# Patient Record
Sex: FEMALE | Race: WHITE | NOT HISPANIC OR LATINO | Employment: UNEMPLOYED | URBAN - METROPOLITAN AREA
[De-identification: names, ages, dates, MRNs, and addresses within clinical notes are randomized per-mention and may not be internally consistent; named-entity substitution may affect disease eponyms.]

---

## 2023-01-01 ENCOUNTER — APPOINTMENT (OUTPATIENT)
Dept: CT IMAGING | Facility: CLINIC | Age: 53
DRG: 871 | End: 2023-01-01
Attending: EMERGENCY MEDICINE

## 2023-01-01 ENCOUNTER — HOSPITAL ENCOUNTER (INPATIENT)
Facility: CLINIC | Age: 53
LOS: 1 days | DRG: 871 | End: 2023-07-15
Attending: EMERGENCY MEDICINE | Admitting: STUDENT IN AN ORGANIZED HEALTH CARE EDUCATION/TRAINING PROGRAM

## 2023-01-01 VITALS
TEMPERATURE: 97.7 F | HEART RATE: 87 BPM | RESPIRATION RATE: 26 BRPM | SYSTOLIC BLOOD PRESSURE: 77 MMHG | DIASTOLIC BLOOD PRESSURE: 45 MMHG | WEIGHT: 72 LBS | OXYGEN SATURATION: 100 %

## 2023-01-01 DIAGNOSIS — K76.7 HEPATORENAL FAILURE (H): ICD-10-CM

## 2023-01-01 LAB
ALBUMIN SERPL BCG-MCNC: 2.5 G/DL (ref 3.5–5.2)
ALP SERPL-CCNC: 191 U/L (ref 35–104)
ALT SERPL W P-5'-P-CCNC: 90 U/L (ref 0–50)
AMMONIA PLAS-SCNC: 233 UMOL/L (ref 11–51)
ANION GAP SERPL CALCULATED.3IONS-SCNC: 24 MMOL/L (ref 7–15)
AST SERPL W P-5'-P-CCNC: 202 U/L (ref 0–45)
ATRIAL RATE - MUSE: 82 BPM
BASOPHILS # BLD MANUAL: 0 10E3/UL (ref 0–0.2)
BASOPHILS NFR BLD MANUAL: 0 %
BILIRUB SERPL-MCNC: 34.2 MG/DL
BUN SERPL-MCNC: 156 MG/DL (ref 6–20)
BURR CELLS BLD QL SMEAR: SLIGHT
CALCIUM SERPL-MCNC: 8.5 MG/DL (ref 8.6–10)
CHLORIDE SERPL-SCNC: 92 MMOL/L (ref 98–107)
CREAT SERPL-MCNC: 7.97 MG/DL (ref 0.51–0.95)
DEPRECATED HCO3 PLAS-SCNC: 20 MMOL/L (ref 22–29)
DIASTOLIC BLOOD PRESSURE - MUSE: NORMAL MMHG
EOSINOPHIL # BLD MANUAL: 0 10E3/UL (ref 0–0.7)
EOSINOPHIL NFR BLD MANUAL: 0 %
ERYTHROCYTE [DISTWIDTH] IN BLOOD BY AUTOMATED COUNT: 15.1 % (ref 10–15)
GFR SERPL CREATININE-BSD FRML MDRD: 6 ML/MIN/1.73M2
GLUCOSE SERPL-MCNC: 119 MG/DL (ref 70–99)
HCT VFR BLD AUTO: 18 % (ref 35–47)
HGB BLD-MCNC: 6.6 G/DL (ref 11.7–15.7)
INR PPP: 2.85 (ref 0.85–1.15)
INTERPRETATION ECG - MUSE: NORMAL
LACTATE SERPL-SCNC: 2.8 MMOL/L (ref 0.7–2)
LYMPHOCYTES # BLD MANUAL: 0 10E3/UL (ref 0.8–5.3)
LYMPHOCYTES NFR BLD MANUAL: 0 %
MAGNESIUM SERPL-MCNC: 2.8 MG/DL (ref 1.7–2.3)
MCH RBC QN AUTO: 40.5 PG (ref 26.5–33)
MCHC RBC AUTO-ENTMCNC: 36.7 G/DL (ref 31.5–36.5)
MCV RBC AUTO: 110 FL (ref 78–100)
METAMYELOCYTES # BLD MANUAL: 0.5 10E3/UL
METAMYELOCYTES NFR BLD MANUAL: 2 %
MONOCYTES # BLD MANUAL: 0 10E3/UL (ref 0–1.3)
MONOCYTES NFR BLD MANUAL: 0 %
NEUTROPHILS # BLD MANUAL: 23 10E3/UL (ref 1.6–8.3)
NEUTROPHILS NFR BLD MANUAL: 98 %
P AXIS - MUSE: 42 DEGREES
PLAT MORPH BLD: ABNORMAL
PLATELET # BLD AUTO: 46 10E3/UL (ref 150–450)
POTASSIUM SERPL-SCNC: 3.3 MMOL/L (ref 3.4–5.3)
PR INTERVAL - MUSE: 172 MS
PROT SERPL-MCNC: ABNORMAL G/DL
QRS DURATION - MUSE: 78 MS
QT - MUSE: 452 MS
QTC - MUSE: 528 MS
R AXIS - MUSE: 3 DEGREES
RBC # BLD AUTO: 1.63 10E6/UL (ref 3.8–5.2)
RBC MORPH BLD: ABNORMAL
SODIUM SERPL-SCNC: 136 MMOL/L (ref 136–145)
SYSTOLIC BLOOD PRESSURE - MUSE: NORMAL MMHG
T AXIS - MUSE: 47 DEGREES
VENTRICULAR RATE- MUSE: 82 BPM
WBC # BLD AUTO: 23.5 10E3/UL (ref 4–11)

## 2023-01-01 PROCEDURE — 83735 ASSAY OF MAGNESIUM: CPT | Performed by: EMERGENCY MEDICINE

## 2023-01-01 PROCEDURE — 93005 ELECTROCARDIOGRAM TRACING: CPT

## 2023-01-01 PROCEDURE — 96376 TX/PRO/DX INJ SAME DRUG ADON: CPT

## 2023-01-01 PROCEDURE — 84075 ASSAY ALKALINE PHOSPHATASE: CPT | Performed by: EMERGENCY MEDICINE

## 2023-01-01 PROCEDURE — 82140 ASSAY OF AMMONIA: CPT | Performed by: EMERGENCY MEDICINE

## 2023-01-01 PROCEDURE — 70450 CT HEAD/BRAIN W/O DYE: CPT

## 2023-01-01 PROCEDURE — 85027 COMPLETE CBC AUTOMATED: CPT | Performed by: EMERGENCY MEDICINE

## 2023-01-01 PROCEDURE — 250N000009 HC RX 250: Performed by: STUDENT IN AN ORGANIZED HEALTH CARE EDUCATION/TRAINING PROGRAM

## 2023-01-01 PROCEDURE — 99238 HOSP IP/OBS DSCHRG MGMT 30/<: CPT | Performed by: NURSE PRACTITIONER

## 2023-01-01 PROCEDURE — 120N000001 HC R&B MED SURG/OB

## 2023-01-01 PROCEDURE — 36415 COLL VENOUS BLD VENIPUNCTURE: CPT | Performed by: EMERGENCY MEDICINE

## 2023-01-01 PROCEDURE — 85610 PROTHROMBIN TIME: CPT | Performed by: EMERGENCY MEDICINE

## 2023-01-01 PROCEDURE — 71250 CT THORAX DX C-: CPT

## 2023-01-01 PROCEDURE — 99207 PR NO BILLABLE SERVICE THIS VISIT: CPT | Performed by: STUDENT IN AN ORGANIZED HEALTH CARE EDUCATION/TRAINING PROGRAM

## 2023-01-01 PROCEDURE — 96374 THER/PROPH/DIAG INJ IV PUSH: CPT

## 2023-01-01 PROCEDURE — 96361 HYDRATE IV INFUSION ADD-ON: CPT

## 2023-01-01 PROCEDURE — 85007 BL SMEAR W/DIFF WBC COUNT: CPT | Performed by: EMERGENCY MEDICINE

## 2023-01-01 PROCEDURE — 99285 EMERGENCY DEPT VISIT HI MDM: CPT | Mod: 25

## 2023-01-01 PROCEDURE — 83605 ASSAY OF LACTIC ACID: CPT | Performed by: EMERGENCY MEDICINE

## 2023-01-01 PROCEDURE — 80048 BASIC METABOLIC PNL TOTAL CA: CPT | Performed by: EMERGENCY MEDICINE

## 2023-01-01 PROCEDURE — 250N000011 HC RX IP 250 OP 636: Performed by: STUDENT IN AN ORGANIZED HEALTH CARE EDUCATION/TRAINING PROGRAM

## 2023-01-01 PROCEDURE — 258N000003 HC RX IP 258 OP 636: Performed by: EMERGENCY MEDICINE

## 2023-01-01 RX ORDER — PROCHLORPERAZINE MALEATE 10 MG
10 TABLET ORAL EVERY 6 HOURS PRN
Status: DISCONTINUED | OUTPATIENT
Start: 2023-01-01 | End: 2023-01-01 | Stop reason: HOSPADM

## 2023-01-01 RX ORDER — OLANZAPINE 5 MG/1
5 TABLET, ORALLY DISINTEGRATING ORAL EVERY 6 HOURS PRN
Status: DISCONTINUED | OUTPATIENT
Start: 2023-01-01 | End: 2023-01-01 | Stop reason: HOSPADM

## 2023-01-01 RX ORDER — LORAZEPAM 1 MG/1
1 TABLET ORAL
Status: DISCONTINUED | OUTPATIENT
Start: 2023-01-01 | End: 2023-01-01 | Stop reason: HOSPADM

## 2023-01-01 RX ORDER — MORPHINE SULFATE 2 MG/ML
2 INJECTION, SOLUTION INTRAMUSCULAR; INTRAVENOUS
Status: DISCONTINUED | OUTPATIENT
Start: 2023-01-01 | End: 2023-01-01 | Stop reason: HOSPADM

## 2023-01-01 RX ORDER — ATROPINE SULFATE 10 MG/ML
2 SOLUTION/ DROPS OPHTHALMIC EVERY 4 HOURS PRN
Status: DISCONTINUED | OUTPATIENT
Start: 2023-01-01 | End: 2023-01-01 | Stop reason: HOSPADM

## 2023-01-01 RX ORDER — MORPHINE SULFATE 4 MG/ML
4 INJECTION, SOLUTION INTRAMUSCULAR; INTRAVENOUS ONCE
Status: COMPLETED | OUTPATIENT
Start: 2023-01-01 | End: 2023-01-01

## 2023-01-01 RX ORDER — MORPHINE SULFATE 20 MG/ML
5 SOLUTION ORAL
Status: DISCONTINUED | OUTPATIENT
Start: 2023-01-01 | End: 2023-01-01 | Stop reason: HOSPADM

## 2023-01-01 RX ORDER — MORPHINE SULFATE 2 MG/ML
1 INJECTION, SOLUTION INTRAMUSCULAR; INTRAVENOUS
Status: DISCONTINUED | OUTPATIENT
Start: 2023-01-01 | End: 2023-01-01 | Stop reason: HOSPADM

## 2023-01-01 RX ORDER — MORPHINE SULFATE 10 MG/5ML
5 SOLUTION ORAL
Status: DISCONTINUED | OUTPATIENT
Start: 2023-01-01 | End: 2023-01-01 | Stop reason: HOSPADM

## 2023-01-01 RX ORDER — BISACODYL 10 MG
10 SUPPOSITORY, RECTAL RECTAL
Status: DISCONTINUED | OUTPATIENT
Start: 2023-07-18 | End: 2023-01-01 | Stop reason: HOSPADM

## 2023-01-01 RX ORDER — ONDANSETRON 4 MG/1
4 TABLET, ORALLY DISINTEGRATING ORAL EVERY 6 HOURS PRN
Status: DISCONTINUED | OUTPATIENT
Start: 2023-01-01 | End: 2023-01-01 | Stop reason: HOSPADM

## 2023-01-01 RX ORDER — CARBOXYMETHYLCELLULOSE SODIUM 5 MG/ML
1-2 SOLUTION/ DROPS OPHTHALMIC
Status: DISCONTINUED | OUTPATIENT
Start: 2023-01-01 | End: 2023-01-01 | Stop reason: HOSPADM

## 2023-01-01 RX ORDER — LORAZEPAM 2 MG/ML
1 INJECTION INTRAMUSCULAR
Status: DISCONTINUED | OUTPATIENT
Start: 2023-01-01 | End: 2023-01-01 | Stop reason: HOSPADM

## 2023-01-01 RX ORDER — NALOXONE HYDROCHLORIDE 0.4 MG/ML
0.2 INJECTION, SOLUTION INTRAMUSCULAR; INTRAVENOUS; SUBCUTANEOUS
Status: DISCONTINUED | OUTPATIENT
Start: 2023-01-01 | End: 2023-01-01 | Stop reason: HOSPADM

## 2023-01-01 RX ORDER — GLYCOPYRROLATE 0.2 MG/ML
0.2 INJECTION, SOLUTION INTRAMUSCULAR; INTRAVENOUS EVERY 4 HOURS PRN
Status: DISCONTINUED | OUTPATIENT
Start: 2023-01-01 | End: 2023-01-01 | Stop reason: HOSPADM

## 2023-01-01 RX ORDER — MINERAL OIL/HYDROPHIL PETROLAT
OINTMENT (GRAM) TOPICAL
Status: DISCONTINUED | OUTPATIENT
Start: 2023-01-01 | End: 2023-01-01 | Stop reason: HOSPADM

## 2023-01-01 RX ORDER — PROCHLORPERAZINE 25 MG
25 SUPPOSITORY, RECTAL RECTAL EVERY 12 HOURS PRN
Status: DISCONTINUED | OUTPATIENT
Start: 2023-01-01 | End: 2023-01-01 | Stop reason: HOSPADM

## 2023-01-01 RX ORDER — ECHINACEA PURPUREA EXTRACT 125 MG
1 TABLET ORAL
Status: DISCONTINUED | OUTPATIENT
Start: 2023-01-01 | End: 2023-01-01 | Stop reason: HOSPADM

## 2023-01-01 RX ORDER — HALOPERIDOL 5 MG/ML
1 INJECTION INTRAMUSCULAR
Status: DISCONTINUED | OUTPATIENT
Start: 2023-01-01 | End: 2023-01-01 | Stop reason: HOSPADM

## 2023-01-01 RX ORDER — NALOXONE HYDROCHLORIDE 0.4 MG/ML
0.1 INJECTION, SOLUTION INTRAMUSCULAR; INTRAVENOUS; SUBCUTANEOUS
Status: DISCONTINUED | OUTPATIENT
Start: 2023-01-01 | End: 2023-01-01 | Stop reason: HOSPADM

## 2023-01-01 RX ORDER — SALIVA STIMULANT COMB. NO.3
2 SPRAY, NON-AEROSOL (ML) MUCOUS MEMBRANE
Status: DISCONTINUED | OUTPATIENT
Start: 2023-01-01 | End: 2023-01-01 | Stop reason: HOSPADM

## 2023-01-01 RX ORDER — MORPHINE SULFATE 10 MG/5ML
10 SOLUTION ORAL
Status: DISCONTINUED | OUTPATIENT
Start: 2023-01-01 | End: 2023-01-01 | Stop reason: HOSPADM

## 2023-01-01 RX ORDER — MORPHINE SULFATE 20 MG/ML
10 SOLUTION ORAL
Status: DISCONTINUED | OUTPATIENT
Start: 2023-01-01 | End: 2023-01-01 | Stop reason: HOSPADM

## 2023-01-01 RX ORDER — ONDANSETRON 2 MG/ML
4 INJECTION INTRAMUSCULAR; INTRAVENOUS EVERY 6 HOURS PRN
Status: DISCONTINUED | OUTPATIENT
Start: 2023-01-01 | End: 2023-01-01 | Stop reason: HOSPADM

## 2023-01-01 RX ADMIN — HALOPERIDOL LACTATE 1 MG: 5 INJECTION, SOLUTION INTRAMUSCULAR at 17:31

## 2023-01-01 RX ADMIN — LORAZEPAM 1 MG: 2 INJECTION INTRAMUSCULAR; INTRAVENOUS at 17:16

## 2023-01-01 RX ADMIN — GLYCOPYRROLATE 0.2 MG: 0.2 INJECTION, SOLUTION INTRAMUSCULAR; INTRAVENOUS at 17:30

## 2023-01-01 RX ADMIN — MORPHINE SULFATE 4 MG: 4 INJECTION, SOLUTION INTRAMUSCULAR; INTRAVENOUS at 14:31

## 2023-01-01 RX ADMIN — MORPHINE SULFATE 2 MG: 2 INJECTION, SOLUTION INTRAMUSCULAR; INTRAVENOUS at 16:15

## 2023-01-01 RX ADMIN — SODIUM CHLORIDE 1000 ML: 9 INJECTION, SOLUTION INTRAVENOUS at 11:25

## 2023-01-01 RX ADMIN — MORPHINE SULFATE 2 MG: 2 INJECTION, SOLUTION INTRAMUSCULAR; INTRAVENOUS at 17:16

## 2023-01-01 ASSESSMENT — ACTIVITIES OF DAILY LIVING (ADL)
ADLS_ACUITY_SCORE: 35

## 2023-07-15 PROBLEM — K76.7 HEPATORENAL FAILURE (H): Status: ACTIVE | Noted: 2023-01-01

## 2023-07-15 NOTE — PROGRESS NOTES
RECEIVING UNIT ED HANDOFF REVIEW    ED Nurse Handoff Report was reviewed by: Fred Santizo RN on July 15, 2023 at 4:08 PM       Addendum:  Patient  at 17:45.      -- Fred Santizo RN

## 2023-07-15 NOTE — DISCHARGE SUMMARY
[facility]    Death Summary - Hospitalist Service     Date of Admission:  7/15/2023  Date of Death: 7/15/2023  Discharging Provider: Dimitrios Johnson MD    Discharge Diagnoses   Presumed hepatorenal syndrome  Hypochloremia  Acute severe kidney injury  Hepatic encephalopathy, severe  Cirrhosis, due to EtOH use  Acute liver failure  Severe EtOH use disorder  Lactic acidosis, suspect due to liver failure  Leukocytosis  Sepsis, severe, considered (leukocytosis, lactic acidosis, hypotension, tachypnea)  Macrocytic, severe anemia, likely related to liver failure and EtOH use  Elevated INR due to liver failure  Ascites, due to liver failure  Hypokalemia  AGMA  Comfort Cares    Cause of death: Cirrhosis likely due to EtOH use disorder resulting in hepatorenal syndrome.    Hospital Course   Yesenia Vail is a 53 year old female admitted on 7/15/2023. She presented with likely hepatorenal syndrome. After discussion of patient's dire prognosis based on her presenting vitals, exam, labs, and prior history, family opted for comfort cares. Patient  several hours later on comfort cares.    May she rest in peace.      Dimitrios Johnson MD  [facility]  ______________________________________________________________________      Significant Results and Procedures   Most Recent 3 CBC's:Recent Labs   Lab Test 07/15/23  1122   WBC 23.5*   HGB 6.6*   *   PLT 46*     Most Recent 3 BMP's:Recent Labs   Lab Test 07/15/23  1122      POTASSIUM 3.3*   CHLORIDE 92*   CO2 20*   .0*   CR 7.97*   ANIONGAP 24*   KADE 8.5*   *     Most Recent 2 LFT's:Recent Labs   Lab Test 07/15/23  1122   *   ALT 90*   ALKPHOS 191*   BILITOTAL 34.2*     Most Recent 3 INR's:Recent Labs   Lab Test 07/15/23  1122   INR 2.85*   ,   Results for orders placed or performed during the hospital encounter of 07/15/23   CT Head w/o Contrast    Narrative    EXAM: CT HEAD WITHOUT  CONTRAST  LOCATION: [facility]  DATE: 07/15/2023    INDICATION: Altered mental status.  COMPARISON: None.  TECHNIQUE: Routine CT Head without IV contrast. Multiplanar reformats. Dose reduction techniques were used.    FINDINGS:  INTRACRANIAL CONTENTS: No intracranial hemorrhage, extra-axial collection, or mass effect.  No CT evidence of acute infarct. Mild presumed chronic small vessel ischemic changes. Mild generalized volume loss. No hydrocephalus.     VISUALIZED ORBITS/SINUSES/MASTOIDS: No intraorbital abnormality. No paranasal sinus mucosal disease. No middle ear or mastoid effusion.    BONES/SOFT TISSUES: No acute abnormality.      Impression    IMPRESSION:  1.  No CT evidence for acute intracranial process.  2.  Brain atrophy and presumed chronic microvascular ischemic changes as above.       CT Chest Abdomen Pelvis w/o Contrast    Narrative    EXAM: CT CHEST, ABDOMEN, PELVIS WITHOUT CONTRAST  LOCATION: [facility]  DATE: 7/15/2023    INDICATION: Altered mental status. Severe jaundice.  COMPARISON: None.  TECHNIQUE: CT scan of the chest, abdomen, and pelvis was performed without IV contrast. Multiplanar reformats were obtained. Dose reduction techniques were used.   CONTRAST: None.    FINDINGS:   LUNGS AND PLEURA: Trace bibasilar pleural fluid. Motion artifact limits assessment. Bibasilar atelectasis.    MEDIASTINUM/AXILLAE: Small hiatal hernia. No suspicious enlarged lymph node. No acute mediastinal abnormality.    CORONARY ARTERY CALCIFICATION: None.    HEPATOBILIARY: Lobulated liver compatible with hepatic cirrhosis. Unenhanced assessment of the liver is limited to visualize any potential masses. Increased density within the gallbladder that could be ill-defined stones versus other material.    PANCREAS: Some edema about the pancreas noted. Within limits of unenhanced scanning, no definable lesion can be seen but this is a limited assessment for pancreas  mass.    SPLEEN: Normal.    ADRENAL GLANDS: Normal.    KIDNEYS/BLADDER: No hydronephrosis or stone. Bladder unremarkable.    BOWEL: No obstruction. No convincing acute inflammation.    LYMPH NODES: Normal.    VASCULATURE: Unremarkable.    PELVIC ORGANS: No acute abnormality identified. The uterus is present. No adnexal visible lesion.    MUSCULOSKELETAL: Moderate ascites. There is a degree of anasarca. No aggressive bone lesion identified.      Impression    IMPRESSION:  1.  Cirrhotic liver. Moderate ascites. Anasarca noted.  2.  Trace bibasilar pleural fluid.  3.  Increased density material within the gallbladder could be gallstones versus other material.  4.  Unenhanced scanning is a limited assessment for underlying hepatic or pancreas masses.             Consultations This Hospital Stay   None    Primary Care Physician   Physician No Ref-Primary    Time Spent on this Encounter   IDimitrios MD, personally saw the patient today and spent less than or equal to 30 minutes discharging this patient.

## 2023-07-15 NOTE — DEATH PRONOUNCEMENT
[facility] DEATH PRONOUNCEMENT    Called to pronounce Yesenia Vail dead.    Physical Exam: Spontaneous respirations absent, Carotid pulse absent and Heart sounds absent    Patient was pronounced dead at 17:45 PM, July 15, 2023.    Preliminary Cause of Death: severe anemia, presumed hepatorenal syndrome in light of the liver failure and severe alcohol use, in the setting of comfort cares    Principal Problem:    Hepatorenal failure (H)     Infectious disease present?: NO    Communicable disease present? (examples: HIV, chicken pox, TB, Ebola, CJD) :  NO    Multi-drug resistant organism present? (example: MRSA): NO    Please consider an autopsy if any of the following exist:  NO Unexpected or unexplained death during or following any dental, medical, or surgical diagnostic treatment procedures.   NO Death of mother at or up to seven days after delivery.     NO All  and pediatric deaths.     NO Death where the cause is sufficiently obscure to delay completion of the death certificate.   NO Deaths in which autopsy would confirm a suspected illness/condition that would affect surviving family members or recipients of transplanted organs.     The following deaths must be reported to the 's Office:  NO A death that may be due entirely or in part to any factors other than natural disease (recent surgery, recent trauma, suspected abuse/neglect).   NO A death that may be an accident, suicide, or homicide.     NO Any sudden, unexpected death in which there is no prior history of significant heart disease or any other condition associated with sudden death.   NO A death under suspicious, unusual, or unexpected circumstances.    NO Any death which is apparently due to natural causes but in which the  does not have a personal physician familiar with the patient s medical history, social, or environmental situation or the circumstances of the terminal event.   NO Any death apparently due to Sudden Infant  Death Syndrome.     NO Deaths that occur during, in association with, or as consequences of a diagnostic, therapeutic, or anesthetic procedure.   NO Any death in which a fracture of a major bone has occurred within the past (6) six months.   NO A death of persons note seen by their physician within 120 days of demise.     NO Any death in which the  was an inmate of a public institution or was in the custody of Law Enforcement personnel.   NO  All unexpected deaths of children   NO Solid organ donors   NO Unidentified bodies   UNKNOWN Deaths of persons whose bodies are to be cremated or otherwise disposed of so that the bodies will later be unavailable for examination;   NO Deaths unattended by a physician outside of a licensed healthcare facility or licensed residential hospice program   NO Deaths occurring within 24 hours of arrival to a health care facility if death is unexpected.    NO Deaths associated with the decedent s employment.   NO Deaths attributed to acts of terrorism.   NO Any death in which there is uncertainty as to whether it is a medical examiner s care should be discussed with the medical investigator.        Body disposition: Autopsy was discussed with family member:  Significant other in person.  Permission for autopsy was declined.      ELIZABETH Bloom [facility]  Securely message with the [employer]oc[employer] Web Console (learn more here)  Text page via [employer] Paging/Directory

## 2023-07-15 NOTE — H&P
[facility]    History and Physical - Hospitalist Service       Date of Admission:  7/15/2023    Assessment & Plan      Yesenia Vail is a 53 year old female admitted on 7/15/2023. She presents with likely hepatorenal syndrome. Family agreeable to comfort cares.     Comfort cares  * patient is very near death and critically ill at time of admission. Family consents to comfort cares in light of very poor prognosis based on admission labs and patient's historic avoidance of healthcare.   - inpatient  - Family declines hospice consult  - comfort cares orderset with morphine and ativan  - life expectancy is hours to a few short days  - Please place fan at bedside for dyspnea    Issues not addressed due to Comfort Care status  Presumed hepatorenal syndrome  Hypochloremia  Acute severe kidney injury  Hepatic encephalopathy, severe  Cirrhosis, due to EtOH use  Acute liver failure  Severe EtOH use disorder  Lactic acidosis, suspect due to liver failure  Leukocytosis  Sepsis, severe, considered (leukocytosis, lactic acidosis, hypotension, tachypnea)  Macrocytic, severe anemia, likely related to liver failure and EtOH use  Elevated INR due to liver failure  Ascites, due to liver failure  Hypokalemia  AGMA    Computed MELD 3.0 unavailable. Necessary lab results were not found in the last year.  MELD-Na: 45 at 7/15/2023 11:22 AM  Calculated from:  Serum Creatinine: 7.97 mg/dL (Using max of 4 mg/dL) at 7/15/2023 11:22 AM  Serum Sodium: 136 mmol/L at 7/15/2023 11:22 AM  Total Bilirubin: 34.2 mg/dL at 7/15/2023 11:22 AM  INR(ratio): 2.85 at 7/15/2023 11:22 AM       Diet:   eat for comfort with regular diet, but expect she will take nothing by mouth  DVT Prophylaxis: comfort care  Cao Catheter: Not present  Lines: None     Cardiac Monitoring: None  Code Status:   DNR/DNI    Clinically Significant Risk Factors Present on Admission        # Hypokalemia: Lowest K = 3.3 mmol/L in last 2 days, will replace  as needed      # Anion Gap Metabolic Acidosis: Highest Anion Gap = 24 mmol/L in last 2 days, will monitor and treat as appropriate  # Hypoalbuminemia: Lowest albumin = 2.5 g/dL at 7/15/2023 11:22 AM, will monitor as appropriate  # Coagulation Defect: INR = 2.85 (Ref range: 0.85 - 1.15) and/or PTT = N/A, will monitor for bleeding  # Thrombocytopenia: Lowest platelets = 46 in last 2 days, will monitor for bleeding                 Disposition Plan      Expected Discharge Date: 07/17/2023                  Dimitrios Johnson MD  Hospitalist Service  [facility]  Securely message with [employer] (more info)  Text page via [facility] Paging/Directory     ______________________________________________________________________    Chief Complaint   AMS    History is obtained from the patient, electronic health record, emergency department physician, patient's daughter, patient's mother and patient's spouse    History of Present Illness   Yesenia Vail is a 53 year old female who has history of chronic alcohol use.  She has historically eschewed healthcare and has not been seen in the last 5 years.  Patient has been drinking alcohol heavily for the last many years.  Over the last week or more family has noticed a progressive change in her level of alertness, appetite, and activity.  On Monday she was able to eat half of a sandwich, but since that time has progressively eaten and drank less fluids.  On 7/14 she was able to eat some watermelon.  Overnight family noticed that there was an acute change in her work of breathing as well as her level of mentation.  Yesterday she was able to verbalize some although family thought that it was somewhat nonsensical and not goal-directed at times.  On 7/15, the patient is completely nonverbal and unresponsive.    In the emergency department she is under the care of Dr. Orlin Gonzales.  Case was discussed with him.  I reviewed the labs which are dire as outlined below  and in the medical record.  I reviewed the CT chest abdomen pelvis and the CT head.  We discussed that her labs are notable for severe anemia, presumed hepatorenal syndrome in light of the liver failure and severe alcohol use.  Dr. Gonzales and I discussed that treatment options are extremely limited in light of her vitals, her clinical exam, her clinical history, and her labs.  Dr. Gonzales reportedly discussed with the family about the extremely dire and life-threatening situation.    I discussed with family about the life-threatening situation.  I discussed that with her current laboratory work and her clinical exam and her history that her chance of survival is extremely low and any intervention that we did would likely just prolong the dying process and inflict discomfort on the patient.  The patient's mother is present and she notes that she has had previous experience with hospice and she thinks that that is appropriate.  The patient's  and daughter are also present and they agree that as there is nothing that we can be certain will save the patient's life they think that hospice is also appropriate.  We discussed admission under the comfort care guidelines and whether they would like hospice consulted while she is present.  They declined involvement of hospice while she is admitted.  We will admit the patient under inpatient status.  Expect that she will have hours to days left.      Past Medical History    No past medical history on file.    Past Surgical History   No past surgical history on file.    Prior to Admission Medications   None           Physical Exam   Vital Signs: Temp: 97.7  F (36.5  C) Temp src: Temporal BP: (!) 86/61 Pulse: 85   Resp: 23 SpO2: 99 % O2 Device: None (Room air)    Weight: 72 lbs 0 oz    Constitutional: The patient appears dyspneic and in moderate respiratory distress.  She appears acutely ill and appears to be in the active dying process  Respiratory: She is dyspneic  with increased work of breathing.  She is tachypneic  Cardiovascular: Regular rate on telemetry.  GI: Mildly distended but not obviously tender  Skin/Integumen: The patient is extremely jaundiced.  Other:       Medical Decision Making       ------------------ MEDICAL DECISION MAKING ------------------------------------------------------------------------------------------------------  MANAGEMENT DISCUSSED with the following over the past 24 hours: ED MD, family, RN, pharmD   NOTE(S)/MEDICAL RECORDS REVIEWED over the past 24 hours: ED note  Tests ORDERED & REVIEWED in the past 24 hours:  - BMP  - CBC  - Coags/INR  - Lactic Acid  - LFTs  - Troponin  Tests personally interpreted in the past 24 hours:  - CHEST CT showing effusions  - ABDOMINAL CT showing ascites, cirrhosis  - HEAD CT showing no acute findings  SUPPLEMENTAL HISTORY, in addition to the patient's history, over the past 24 hours obtained from:   - Spouse or significant other  - mother and daughter  Medical complexity over the past 24 hours:  - Decision to DE-ESCALATE CARE based on prognosis      Data   ------------------------- PAST 24 HR DATA REVIEWED -----------------------------------------------    I have personally reviewed the following data over the past 24 hrs:    23.5 (H)  \   6.6 (LL)   / 46 (LL)     136 92 (L) 156.0 (H) /  119 (H)   3.3 (L) 20 (L) 7.97 (H) \       ALT: 90 (H) AST: 202 (H) AP: 191 (H) TBILI: 34.2 (HH)   ALB: 2.5 (L) TOT PROTEIN: N/A LIPASE: N/A       Procal: N/A CRP: N/A Lactic Acid: 2.8 (H)       INR:  2.85 (H) PTT:  N/A   D-dimer:  N/A Fibrinogen:  N/A       Imaging results reviewed over the past 24 hrs:   Recent Results (from the past 24 hour(s))   CT Head w/o Contrast    Narrative    EXAM: CT HEAD WITHOUT CONTRAST  LOCATION: [facility] [place]  DATE: 07/15/2023    INDICATION: Altered mental status.  COMPARISON: None.  TECHNIQUE: Routine CT Head without IV contrast. Multiplanar reformats. Dose reduction  techniques were used.    FINDINGS:  INTRACRANIAL CONTENTS: No intracranial hemorrhage, extra-axial collection, or mass effect.  No CT evidence of acute infarct. Mild presumed chronic small vessel ischemic changes. Mild generalized volume loss. No hydrocephalus.     VISUALIZED ORBITS/SINUSES/MASTOIDS: No intraorbital abnormality. No paranasal sinus mucosal disease. No middle ear or mastoid effusion.    BONES/SOFT TISSUES: No acute abnormality.      Impression    IMPRESSION:  1.  No CT evidence for acute intracranial process.  2.  Brain atrophy and presumed chronic microvascular ischemic changes as above.       CT Chest Abdomen Pelvis w/o Contrast    Narrative    EXAM: CT CHEST, ABDOMEN, PELVIS WITHOUT CONTRAST  LOCATION: [facility]  DATE: 7/15/2023    INDICATION: Altered mental status. Severe jaundice.  COMPARISON: None.  TECHNIQUE: CT scan of the chest, abdomen, and pelvis was performed without IV contrast. Multiplanar reformats were obtained. Dose reduction techniques were used.   CONTRAST: None.    FINDINGS:   LUNGS AND PLEURA: Trace bibasilar pleural fluid. Motion artifact limits assessment. Bibasilar atelectasis.    MEDIASTINUM/AXILLAE: Small hiatal hernia. No suspicious enlarged lymph node. No acute mediastinal abnormality.    CORONARY ARTERY CALCIFICATION: None.    HEPATOBILIARY: Lobulated liver compatible with hepatic cirrhosis. Unenhanced assessment of the liver is limited to visualize any potential masses. Increased density within the gallbladder that could be ill-defined stones versus other material.    PANCREAS: Some edema about the pancreas noted. Within limits of unenhanced scanning, no definable lesion can be seen but this is a limited assessment for pancreas mass.    SPLEEN: Normal.    ADRENAL GLANDS: Normal.    KIDNEYS/BLADDER: No hydronephrosis or stone. Bladder unremarkable.    BOWEL: No obstruction. No convincing acute inflammation.    LYMPH NODES: Normal.    VASCULATURE:  Unremarkable.    PELVIC ORGANS: No acute abnormality identified. The uterus is present. No adnexal visible lesion.    MUSCULOSKELETAL: Moderate ascites. There is a degree of anasarca. No aggressive bone lesion identified.      Impression    IMPRESSION:  1.  Cirrhotic liver. Moderate ascites. Anasarca noted.  2.  Trace bibasilar pleural fluid.  3.  Increased density material within the gallbladder could be gallstones versus other material.  4.  Unenhanced scanning is a limited assessment for underlying hepatic or pancreas masses.

## 2023-07-15 NOTE — ED PROVIDER NOTES
History   Chief Complaint:  Altered Mental Status     The history is provided by the EMS personnel and the spouse.      Yesenia Vail is a 53 year old female with history of jaundice and cirrhosis who presents with altered mental state.  states she has been dealing with jaundice for a long time but it is getting worse. She has not seen a doctor in 5 years. She was diagnosed with cirrhosis of the liver. Patient continued drinking alcohol 3 years ago.  reports she was sitting and talking normally yesterday. Yesenia has been having a decreased appetite.     Independent Historian:   Spouse/Partner - They report the history.    Review of External Notes:   none    Medications:    The patient is currently on no regular medications.    Past Medical History:    Jaundice   Cirrhosis  Hepatorenal failure     Physical Exam     Patient Vitals for the past 24 hrs:   BP Temp Temp src Pulse Resp SpO2 Weight   07/15/23 1426 (!) 77/45 -- -- 87 26 100 % --   07/15/23 1307 -- -- -- 85 23 99 % --   07/15/23 1252 -- -- -- 86 21 100 % --   07/15/23 1237 -- -- -- 82 29 100 % --   07/15/23 1222 -- -- -- 83 16 100 % --   07/15/23 1207 (!) 86/61 -- -- 85 18 (!) 73 % --   07/15/23 1143 97/58 -- -- 86 17 97 % --   07/15/23 1115 90/50 -- -- -- -- 96 % --   07/15/23 1110 (!) 89/60 97.7  F (36.5  C) Temporal 89 14 98 % 32.7 kg (72 lb)      Physical Exam  Constitutional: Middle aged white women supine  HENT: No signs of trauma. Extreme jaundiced. Bloody drainage from lips.   Eyes: EOM are normal. Pupils 4mm, round, deviated to the right unable to cooperate with EOM.  Neck: Normal range of motion. No JVD present. No cervical adenopathy.  Cardiovascular: Regular rhythm.  Exam reveals no gallop and no friction rub.    No murmur heard.  Pulmonary/Chest: Bilateral breath sounds normal. No wheezes, rhonchi or rales. No respiratory distress.  Abdominal: Soft. No tenderness. No rebound or guarding. Distended with ascites.    Musculoskeletal: 1+  edema. No tenderness.   Lymphadenopathy: No lymphadenopathy.   Neurological: Unresponsive to voice. No spontaneous movement or extremities. No facial asymmetry;  Moans, Toes are up bilaterally.  Skin: jaundice    Emergency Department Course   ECG  ECG taken at 1116, ECG read at 1116  Normal sinus rhythm  Possible left atrial enlargment   Nonspecific ST and T wave abnormality  Rate 82 bpm. WI interval 172 ms. QRS duration 78 ms. QT/QTc 452/538 ms. P-R-T axes 42 3 47.     Imaging:  CT Chest Abdomen Pelvis w/o Contrast   Final Result   IMPRESSION:   1.  Cirrhotic liver. Moderate ascites. Anasarca noted.   2.  Trace bibasilar pleural fluid.   3.  Increased density material within the gallbladder could be gallstones versus other material.   4.  Unenhanced scanning is a limited assessment for underlying hepatic or pancreas masses.            CT Head w/o Contrast   Final Result   IMPRESSION:   1.  No CT evidence for acute intracranial process.   2.  Brain atrophy and presumed chronic microvascular ischemic changes as above.               Report per radiology    Laboratory:  Labs Ordered and Resulted from Time of ED Arrival to Time of ED Departure   INR - Abnormal       Result Value    INR 2.85 (*)    COMPREHENSIVE METABOLIC PANEL - Abnormal    Sodium 136      Potassium 3.3 (*)     Chloride 92 (*)     Carbon Dioxide (CO2) 20 (*)     Anion Gap 24 (*)     Urea Nitrogen 156.0 (*)     Creatinine 7.97 (*)     Calcium 8.5 (*)     Glucose 119 (*)     Alkaline Phosphatase 191 (*)      (*)     ALT 90 (*)     Protein Total        Albumin 2.5 (*)     Bilirubin Total 34.2 (*)     GFR Estimate 6 (*)    LACTIC ACID WHOLE BLOOD - Abnormal    Lactic Acid 2.8 (*)    AMMONIA - Abnormal    Ammonia 233 (*)    MAGNESIUM - Abnormal    Magnesium 2.8 (*)    CBC WITH PLATELETS AND DIFFERENTIAL - Abnormal    WBC Count 23.5 (*)     RBC Count 1.63 (*)     Hemoglobin 6.6 (*)     Hematocrit 18.0 (*)      (*)     MCH  40.5 (*)     MCHC 36.7 (*)     RDW 15.1 (*)     Platelet Count 46 (*)    DIFFERENTIAL - Abnormal    % Neutrophils 98      % Lymphocytes 0      % Monocytes 0      % Eosinophils 0      % Basophils 0      % Metamyelocytes 2      Absolute Neutrophils 23.0 (*)     Absolute Lymphocytes 0.0 (*)     Absolute Monocytes 0.0      Absolute Eosinophils 0.0      Absolute Basophils 0.0      Absolute Metamyelocytes 0.5 (*)     RBC Morphology Confirmed RBC Indices      Platelet Assessment        Value: Automated Count Confirmed. Platelet morphology is normal.    Lea Cells Slight (*)       Emergency Department Course & Assessments:    Interventions:  Medications   morphine solution 5 mg (has no administration in time range)     Or   morphine sulfate (ROXANOL) 20 mg/mL (HIGH CONC) soln 5 mg (has no administration in time range)   morphine solution 10 mg (has no administration in time range)     Or   morphine sulfate (ROXANOL) 20 mg/mL (HIGH CONC) soln 10 mg (has no administration in time range)   morphine (PF) injection 1 mg (has no administration in time range)   morphine (PF) injection 2 mg (2 mg Intravenous $Given 7/15/23 1615)   LORazepam (ATIVAN) injection 1 mg (has no administration in time range)     Or   LORazepam (ATIVAN) tablet 1 mg (has no administration in time range)   OLANZapine zydis (zyPREXA) ODT tab 5 mg (has no administration in time range)   prochlorperazine (COMPAZINE) injection 10 mg (has no administration in time range)     Or   prochlorperazine (COMPAZINE) tablet 10 mg (has no administration in time range)     Or   prochlorperazine (COMPAZINE) suppository 25 mg (has no administration in time range)   ondansetron (ZOFRAN ODT) ODT tab 4 mg (has no administration in time range)     Or   ondansetron (ZOFRAN) injection 4 mg (has no administration in time range)   atropine 1 % ophthalmic solution 2 drop (has no administration in time range)   carboxymethylcellulose PF (REFRESH PLUS) 0.5 % ophthalmic solution 1-2 drop  (has no administration in time range)   mineral oil-hydrophilic petrolatum (AQUAPHOR) (has no administration in time range)   artificial saliva (BIOTENE MT) solution 2 spray (has no administration in time range)   sodium chloride (OCEAN) 0.65 % nasal spray 1 spray (has no administration in time range)   0.9% sodium chloride BOLUS (0 mLs Intravenous Stopped 7/15/23 1443)   morphine (PF) injection 4 mg (4 mg Intravenous $Given 7/15/23 1431)      Assessments:  1102 I obtained history and examined the patient as noted above.   1320 I rechecked and updated the patient.     Independent Interpretation (X-rays, CTs, rhythm strip):  None    Consultations/Discussion of Management or Tests:  I spoke with Dr. Johnson of the hospitalist service who accepts the patient for admission.     Social Determinants of Health affecting care:   None    Disposition:  The patient was admitted to the hospital under the care of Dr. Johnson.     Impression & Plan    Medical Decision Making:  This is a 53 year old female who presents to the emergency department by ambulance. Family states she has been jaundiced for a long time and they expected she would eventually die at home, however her symptoms changed dramatically from yesterday where she became unresponsive. She was diagnosed with alcoholic cirrhosis about 5 years ago and has been jaundiced as noted for months. She continues to drink. She has not seen a doctor for 5 years. On my initial exam, she is unresponsive. She does withdraw to pain. She is nonverbal and only moans. She is severely jaundiced. She has abdominal ascites and she is hypotensive and encephalopathic.. Family arrived shortly after the patient. We had a long discussion about aggressiveness of treatment. I did agree to do a work up, which included CT scan and labs. She was found to be in hepatorenal failure. She did not have a head bleed or bowel obstruction. I had another long talk with both the patient's daughter,  mother, and , and we came to an agreement that hospitalist care was appropriate for this patient with long term disease and essentially irreversible disease at this point. Patient has been discussed with hospitalist and we will admit to hospice unit.     Diagnosis:    ICD-10-CM    1. Hepatorenal failure (H)  K76.7         Scribe Disclosure:  TAMMIE, Rahul Robles and Susana Kunz, am serving as a scribe at 11:38 AM on 7/15/2023 to document services personally performed by Prosper Dimas MD based on my observations and the provider's statements to me.     7/15/2023   Prosper Dimas MD Steinman, Randall Ira, MD  07/15/23 [id number]

## 2024-01-18 NOTE — ED NOTES
Bed: ED23  Expected date:   Expected time:   Means of arrival:   Comments:  Stab 1  
Bed: ST  Expected date:   Expected time:   Means of arrival:   Comments:  421  53 F end stage liver cancer/unresponsvie  Few minutes  
DATE/TIME OF CALL RECEIVED FROM LAB:  07/15/23 at 11:41 AM   LAB TEST:  Hgb  LAB VALUE:  6.6  PROVIDER NOTIFIED?: Yes  PROVIDER NAME: Judie  DATE/TIME LAB VALUE REPORTED TO PROVIDER: 11:41 AM    MECHANISM OF PROVIDER NOTIFICATION: Phone Call  PROVIDER RESPONSE: acknowledged    
DATE/TIME OF CALL RECEIVED FROM LAB:  07/15/23 at 11:53 AM   LAB TEST:  Ammonia  / Bilirubin  LAB VALUE:  233  /  34.2  PROVIDER NOTIFIED?: Yes  PROVIDER NAME: bee  DATE/TIME LAB VALUE REPORTED TO PROVIDER: 11:54 AM    MECHANISM OF PROVIDER NOTIFICATION: Face-To-Face  PROVIDER RESPONSE: Acknowledged    
[facility]  ED Nurse Handoff Report    ED Chief complaint: Altered Mental Status      ED Diagnosis:   Final diagnoses:   Hepatorenal failure (H)       Code Status: Comfort Care    Allergies:   Allergies   Allergen Reactions    Penicillins Unknown       Patient Story: liver failure / altered  Focused Assessment:  Pt arrived to ED altered and unresponsive.  Pt had not seen a doctor for 5 years. Pt jaundice    Treatments and/or interventions provided: bolus, morphine  Patient's response to treatments and/or interventions:       To be done/followed up on inpatient unit:        Does this patient have any cognitive concerns?:  unresponsive    Activity level - Baseline/Home:  Unknown  Activity Level - Current:   Total Care    Patient's Preferred language: English   Needed?: No    Isolation: None  Infection: Not Applicable  Patient tested for COVID 19 prior to admission: NO  Bariatric?: No    Vital Signs:   Vitals:    07/15/23 1237 07/15/23 1252 07/15/23 1307 07/15/23 1426   BP:    (!) 77/45   Pulse: 82 86 85 87   Resp: 29 21 23 26   Temp:       TempSrc:       SpO2: 100% 100% 99% 100%   Weight:           Cardiac Rhythm:     Was the PSS-3 completed:   Yes  What interventions are required if any?               Family Comments: spouse, mother, daughter  OBS brochure/video discussed/provided to patient/family: N/A              Name of person given brochure if not patient:                 Relationship to patient:       For the majority of the shift this patient's behavior was Green.   Behavioral interventions performed were   .    ED NURSE PHONE NUMBER: [id number]         
Verified pt on comfort cares with Dr Johnson.  No further interventions other than making the pt comfortable.  Spouse, mother, and daughter at bedside.  Monitors removed.  
4 = No assist / stand by assistance